# Patient Record
Sex: FEMALE | Race: WHITE
[De-identification: names, ages, dates, MRNs, and addresses within clinical notes are randomized per-mention and may not be internally consistent; named-entity substitution may affect disease eponyms.]

---

## 2019-09-20 ENCOUNTER — HOSPITAL ENCOUNTER (INPATIENT)
Dept: HOSPITAL 26 - MED | Age: 59
LOS: 4 days | Discharge: HOSPICE | DRG: 871 | End: 2019-09-24
Attending: INTERNAL MEDICINE | Admitting: INTERNAL MEDICINE
Payer: COMMERCIAL

## 2019-09-20 VITALS — SYSTOLIC BLOOD PRESSURE: 169 MMHG | DIASTOLIC BLOOD PRESSURE: 103 MMHG

## 2019-09-20 VITALS — SYSTOLIC BLOOD PRESSURE: 133 MMHG | DIASTOLIC BLOOD PRESSURE: 77 MMHG

## 2019-09-20 VITALS — WEIGHT: 146 LBS | HEIGHT: 62 IN | BODY MASS INDEX: 26.87 KG/M2

## 2019-09-20 DIAGNOSIS — E11.9: ICD-10-CM

## 2019-09-20 DIAGNOSIS — J96.01: ICD-10-CM

## 2019-09-20 DIAGNOSIS — D64.9: ICD-10-CM

## 2019-09-20 DIAGNOSIS — C78.01: ICD-10-CM

## 2019-09-20 DIAGNOSIS — Z85.828: ICD-10-CM

## 2019-09-20 DIAGNOSIS — Z85.118: ICD-10-CM

## 2019-09-20 DIAGNOSIS — E83.52: ICD-10-CM

## 2019-09-20 DIAGNOSIS — J44.0: ICD-10-CM

## 2019-09-20 DIAGNOSIS — G47.33: ICD-10-CM

## 2019-09-20 DIAGNOSIS — E87.2: ICD-10-CM

## 2019-09-20 DIAGNOSIS — R65.21: ICD-10-CM

## 2019-09-20 DIAGNOSIS — Z90.710: ICD-10-CM

## 2019-09-20 DIAGNOSIS — Z82.49: ICD-10-CM

## 2019-09-20 DIAGNOSIS — Z80.8: ICD-10-CM

## 2019-09-20 DIAGNOSIS — D47.3: ICD-10-CM

## 2019-09-20 DIAGNOSIS — G93.41: ICD-10-CM

## 2019-09-20 DIAGNOSIS — I11.9: ICD-10-CM

## 2019-09-20 DIAGNOSIS — R74.8: ICD-10-CM

## 2019-09-20 DIAGNOSIS — Z66: ICD-10-CM

## 2019-09-20 DIAGNOSIS — J18.9: ICD-10-CM

## 2019-09-20 DIAGNOSIS — C78.7: ICD-10-CM

## 2019-09-20 DIAGNOSIS — Z92.21: ICD-10-CM

## 2019-09-20 DIAGNOSIS — C79.51: ICD-10-CM

## 2019-09-20 DIAGNOSIS — A41.9: Primary | ICD-10-CM

## 2019-09-20 DIAGNOSIS — C34.92: ICD-10-CM

## 2019-09-20 DIAGNOSIS — E46: ICD-10-CM

## 2019-09-20 DIAGNOSIS — K21.9: ICD-10-CM

## 2019-09-20 LAB
ALBUMIN FLD-MCNC: 2.1 G/DL (ref 3.4–5)
ANION GAP SERPL CALCULATED.3IONS-SCNC: 14.2 MMOL/L (ref 8–16)
AST SERPL-CCNC: 45 U/L (ref 15–37)
BILIRUB SERPL-MCNC: 0.8 MG/DL (ref 0–1)
BUN SERPL-MCNC: 12 MG/DL (ref 7–18)
CHLORIDE SERPL-SCNC: 99 MMOL/L (ref 98–107)
CK MB SERPL-MCNC: 0.3 NG/ML (ref 0–3.6)
CO2 SERPL-SCNC: 25.3 MMOL/L (ref 21–32)
CREAT SERPL-MCNC: 1.1 MG/DL (ref 0.6–1.3)
DEPRECATED D DIMER PPP-ACNC: > 5000 NG/ML (ref 0–400)
ERYTHROCYTE [DISTWIDTH] IN BLOOD BY AUTOMATED COUNT: 22 % (ref 11.6–13.7)
FIBRINOGEN PPP-MCNC: 389 MG/DL (ref 200–400)
GFR SERPL CREATININE-BSD FRML MDRD: 65 ML/MIN (ref 90–?)
GLUCOSE SERPL-MCNC: 145 MG/DL (ref 74–106)
HCT VFR BLD AUTO: 30.6 % (ref 36–48)
HGB BLD-MCNC: 9.5 G/DL (ref 12–16)
LIPASE SERPL-CCNC: 47 U/L (ref 73–393)
LYMPHOCYTES NFR BLD MANUAL: 15 % (ref 20–46)
MCH RBC QN AUTO: 26 PG (ref 27–31)
MCHC RBC AUTO-ENTMCNC: 31 G/DL (ref 33–37)
MCV RBC AUTO: 81.6 FL (ref 80–94)
MONOCYTES NFR BLD MANUAL: 2 % (ref 5–12)
PLATELET # BLD AUTO: 1039 K/UL (ref 140–450)
POTASSIUM SERPL-SCNC: 3.5 MMOL/L (ref 3.5–5.1)
PROTHROMBIN TIME: 12.5 SECS (ref 10.8–13.4)
RBC # BLD AUTO: 3.74 MIL/UL (ref 4.2–5.4)
SODIUM SERPL-SCNC: 135 MMOL/L (ref 136–145)
WBC # BLD AUTO: 34.7 K/UL (ref 4.8–10.8)

## 2019-09-20 PROCEDURE — P9016 RBC LEUKOCYTES REDUCED: HCPCS

## 2019-09-20 PROCEDURE — C1758 CATHETER, URETERAL: HCPCS

## 2019-09-20 PROCEDURE — C1751 CATH, INF, PER/CENT/MIDLINE: HCPCS

## 2019-09-20 RX ADMIN — SODIUM CHLORIDE SCH MLS/HR: 0.9 INJECTION, SOLUTION INTRAVENOUS at 22:44

## 2019-09-20 RX ADMIN — DEXTROSE SCH MLS/HR: 50 INJECTION, SOLUTION INTRAVENOUS at 22:44

## 2019-09-20 NOTE — NUR
Patient will be admitted to care of DR TAVON LYLE. Admited to ICU.  Will go to 
room7. Belongings list completed.  Report to CAROLINE KLEIN.

## 2019-09-20 NOTE — NUR
PT BIB  C/O SOB. PT TACHYPNEA, LABORED BREATHING W/ ACCESSORY MUSCLE 
USE. PT CONFUSED AT THIS TIME, AAOX2. PT PLACED ON 15L NON-REBREATHER MASK AT 
THIS TIME. ER MD AT BEDSIDE.



MEDHX:LUNG CANCER.

## 2019-09-20 NOTE — NUR
RECEIVED BEDSIDE REPORT FROM ER NURSE NICKY, PATIENT IN BED, AAOX 1 TO PERSON, ON 2 L NC, 
V/S STABLE, PATIENT C/O PAIN, UNABLE TO DESCRIBE HOW BAD PAIN IS BUT STATES PAIN IN IN 
ABDOMINAL REGION. PATIENT UNABLE TO FOLLOW SIMPLE COMMANDS BUT ABLE TO MAKE SIMPLE NEEDS 
KNOWN.  MATTHEW AT BEDSIDE ABLE TO ANSWER ADMISSION QUESTIONS FOR PATIENT SINCE PATIENT 
IS UNABLE TO ANSWER QUESTIONS HERSELF. PUPILS ARE EQUAL ROUND AND REACTIVE SIZE 3. RR EVEN 
AND UNLABORED, LUNG SOUNDS CLEAR IN UPPER LOBES BUT DIMINISHED IN LOWER LOBES, CHEST RISE 
EQUAL AND SYMMETRICAL. S1 AND S2 HEARD, CAP REFILL LESS THAN 2 SECONDS, SKIN IS WARM. 
ABDOMEN ROUND AND FIRM. PATIENT IS INCONTINENT OR URINE. SKIN INTACT. IV IN RIGHT AC 20 G 
SL, DRESSING CLEAN AND INTACT. SAFETY PRECAUTIONS IN PLACE, MRSA SCREEN COLLECTED AND SENT 
TO LAB. WILL CONTINUE TO FREQUENTLY MONITOR.

## 2019-09-21 VITALS — DIASTOLIC BLOOD PRESSURE: 109 MMHG | SYSTOLIC BLOOD PRESSURE: 152 MMHG

## 2019-09-21 VITALS — DIASTOLIC BLOOD PRESSURE: 85 MMHG | SYSTOLIC BLOOD PRESSURE: 140 MMHG

## 2019-09-21 VITALS — SYSTOLIC BLOOD PRESSURE: 132 MMHG | DIASTOLIC BLOOD PRESSURE: 78 MMHG

## 2019-09-21 VITALS — DIASTOLIC BLOOD PRESSURE: 89 MMHG | SYSTOLIC BLOOD PRESSURE: 148 MMHG

## 2019-09-21 VITALS — DIASTOLIC BLOOD PRESSURE: 87 MMHG | SYSTOLIC BLOOD PRESSURE: 136 MMHG

## 2019-09-21 VITALS — DIASTOLIC BLOOD PRESSURE: 70 MMHG | SYSTOLIC BLOOD PRESSURE: 126 MMHG

## 2019-09-21 VITALS — SYSTOLIC BLOOD PRESSURE: 118 MMHG | DIASTOLIC BLOOD PRESSURE: 74 MMHG

## 2019-09-21 VITALS — SYSTOLIC BLOOD PRESSURE: 114 MMHG | DIASTOLIC BLOOD PRESSURE: 70 MMHG

## 2019-09-21 VITALS — DIASTOLIC BLOOD PRESSURE: 76 MMHG | SYSTOLIC BLOOD PRESSURE: 123 MMHG

## 2019-09-21 VITALS — DIASTOLIC BLOOD PRESSURE: 83 MMHG | SYSTOLIC BLOOD PRESSURE: 142 MMHG

## 2019-09-21 VITALS — DIASTOLIC BLOOD PRESSURE: 101 MMHG | SYSTOLIC BLOOD PRESSURE: 182 MMHG

## 2019-09-21 VITALS — SYSTOLIC BLOOD PRESSURE: 158 MMHG | DIASTOLIC BLOOD PRESSURE: 84 MMHG

## 2019-09-21 LAB
ALBUMIN FLD-MCNC: 1.5 G/DL (ref 3.4–5)
ANION GAP SERPL CALCULATED.3IONS-SCNC: 8.1 MMOL/L (ref 8–16)
APPEARANCE UR: CLEAR
AST SERPL-CCNC: 36 U/L (ref 15–37)
BILIRUB SERPL-MCNC: 0.6 MG/DL (ref 0–1)
BILIRUB UR QL STRIP: NEGATIVE
BUN SERPL-MCNC: 11 MG/DL (ref 7–18)
CHLORIDE SERPL-SCNC: 103 MMOL/L (ref 98–107)
CO2 SERPL-SCNC: 29.1 MMOL/L (ref 21–32)
COLOR UR: YELLOW
CREAT SERPL-MCNC: 0.9 MG/DL (ref 0.6–1.3)
ERYTHROCYTE [DISTWIDTH] IN BLOOD BY AUTOMATED COUNT: 21.7 % (ref 11.6–13.7)
GFR SERPL CREATININE-BSD FRML MDRD: 82 ML/MIN (ref 90–?)
GLUCOSE SERPL-MCNC: 90 MG/DL (ref 74–106)
GLUCOSE UR STRIP-MCNC: NEGATIVE MG/DL
HCT VFR BLD AUTO: 23.6 % (ref 36–48)
HGB BLD-MCNC: 7.3 G/DL (ref 12–16)
HGB UR QL STRIP: (no result)
LEUKOCYTE ESTERASE UR QL STRIP: NEGATIVE
LYMPHOCYTES NFR BLD MANUAL: 8 % (ref 20–46)
MCH RBC QN AUTO: 25 PG (ref 27–31)
MCHC RBC AUTO-ENTMCNC: 31 G/DL (ref 33–37)
MCV RBC AUTO: 81.9 FL (ref 80–94)
MONOCYTES NFR BLD MANUAL: 5 % (ref 5–12)
NITRITE UR QL STRIP: POSITIVE
PH UR STRIP: 7 [PH] (ref 5–9)
PLATELET # BLD AUTO: 633 K/UL (ref 140–450)
POTASSIUM SERPL-SCNC: 3.2 MMOL/L (ref 3.5–5.1)
RBC # BLD AUTO: 2.87 MIL/UL (ref 4.2–5.4)
RBC #/AREA URNS HPF: (no result) /HPF (ref 0–5)
SODIUM SERPL-SCNC: 137 MMOL/L (ref 136–145)
WBC # BLD AUTO: 22.7 K/UL (ref 4.8–10.8)
WBC,URINE: (no result) /HPF (ref 0–5)

## 2019-09-21 PROCEDURE — 30233N1 TRANSFUSION OF NONAUTOLOGOUS RED BLOOD CELLS INTO PERIPHERAL VEIN, PERCUTANEOUS APPROACH: ICD-10-PCS | Performed by: INTERNAL MEDICINE

## 2019-09-21 RX ADMIN — DEXTROSE SCH MLS/HR: 50 INJECTION, SOLUTION INTRAVENOUS at 06:01

## 2019-09-21 RX ADMIN — DEXTROSE SCH MLS/HR: 50 INJECTION, SOLUTION INTRAVENOUS at 12:00

## 2019-09-21 RX ADMIN — LEVOFLOXACIN SCH MLS/HR: 5 INJECTION, SOLUTION INTRAVENOUS at 18:22

## 2019-09-21 RX ADMIN — MORPHINE SULFATE PRN MG: 2 INJECTION, SOLUTION INTRAMUSCULAR; INTRAVENOUS at 00:47

## 2019-09-21 RX ADMIN — Medication SCH MG: at 21:00

## 2019-09-21 RX ADMIN — DEXTROSE SCH MLS/HR: 50 INJECTION, SOLUTION INTRAVENOUS at 21:06

## 2019-09-21 RX ADMIN — ALBUTEROL SULFATE PRN MG: 2.5 SOLUTION RESPIRATORY (INHALATION) at 20:08

## 2019-09-21 RX ADMIN — DEXTROSE AND SODIUM CHLORIDE SCH MLS/HR: 5; .9 INJECTION, SOLUTION INTRAVENOUS at 15:27

## 2019-09-21 RX ADMIN — SODIUM CHLORIDE SCH MLS/HR: 0.9 INJECTION, SOLUTION INTRAVENOUS at 11:49

## 2019-09-21 RX ADMIN — ACETAMINOPHEN PRN MG: 325 TABLET ORAL at 17:18

## 2019-09-21 RX ADMIN — DEXTROSE SCH MLS/HR: 5 SOLUTION INTRAVENOUS at 21:14

## 2019-09-21 RX ADMIN — DEXTROSE SCH MLS/HR: 50 INJECTION, SOLUTION INTRAVENOUS at 17:17

## 2019-09-21 RX ADMIN — IPRATROPIUM BROMIDE PRN MG: 0.5 SOLUTION RESPIRATORY (INHALATION) at 20:08

## 2019-09-21 RX ADMIN — DEXTROSE SCH MLS/HR: 5 SOLUTION INTRAVENOUS at 10:49

## 2019-09-21 RX ADMIN — SODIUM CHLORIDE SCH MLS/HR: 0.9 INJECTION, SOLUTION INTRAVENOUS at 03:11

## 2019-09-21 NOTE — NUR
PT IS HAVING FEVER 101.3F, TYLENOL GIVEN AND COOLING MEASURE IN USE, BLOOD BANK CALLED THE 
[facility] IS READY, PAGED DR. LYLE REGARDING THE FEVER, WILL WAITING FOR CALL BACK.

## 2019-09-21 NOTE — NUR
IV ON RIGHT AC INFILTRATED, REMOVED IV, INSERTED NEW IV LINE TO LEFT HAND 22GA WITH GOOD 
BLOOD RETURN, PT TOLERATED WELL.

## 2019-09-21 NOTE — NUR
SPOKE WITH DR. MYERS, INFORMED THAT MIDLINE/PICC LINE STILL HAS NOT BEEN INSERTED, STILL 
PENDING ON THE CTA. NO NEW ORDERS AT THIS TIME

## 2019-09-21 NOTE — NUR
PATIENT IN BED WITH EYES CLOSED, RESTING WELL, RESPONDS TO LIGHT SHAKING AND NAME/VOICE. 
ANOx1, PERRL, BRISK, 3MM. WHEEZING HEARD IN UPPER LOBES, DIMINISHED IN LOWER LOBES. PATIENT 
HAS NASAL CANNULA IN PLACE, 2L, SATURATION 99%. SKIN IS WARM AND DRY, INTACT, AXILLARY TEMP 
99.2.. S1S2, SR ON MONITOR. PERIPHERAL IV  LEFT HAND 22G, FLUSHED, PATENT, INTACT INFUSING 
D5NS @ 75ML. RIGHT WRIST 20G SALINE LOCKED, FLUSHED AND ASYMPTOMATIC. ABDOMEN SOFT AND 
NONTENDER, BOWEL SOUNDS ACTIVE IN ALL QUADRANTS. PATIENT IS INCONTINENT, KAMALJIT PADS IN 
PLACE. BED IS LOCKED AND IN LOW POSITION, SIDE RAILS UP, PATIENT ORIENTED TO CALL LIGHT, 
SAFETY MEASURES IN PLACE. WILL CONTINUE TO MONITOR

## 2019-09-21 NOTE — NUR
RECEIVED BEDSIDE REPORT FROM NIGHT SHIFT NURSE, PT AAOX1, DROWSY, ABLE TO FOLLOW COMMANDS 
SOMETIMES, VSS, DENIES PAIN, NO S/S OF DISTRESS, WHEEZING LUNG SOUNDS DANIEL, ON O2 AT 2L VIA 
NC. SR ON CARDIAC MONITOR, CAP REFILL <2 SEC, SOFT ABDOMEN WITH ACTIVE BOWEL SOUNDS, 
INCONTINENT WITH B&B'S, ABLE TO MOVE ALL EXTREMITIES, GENERALZIED WEAKNESS NOTED, SKIN IS 
WARM AND DRY TO TOUCH, NO OPEN WOUND, IV SITE TO RIGHT AC, 20GA, PATENT AND RUNNING NS AT 50 
ML/HR,  HOB ELEVATED TO 30 DEGREES,  POSITION CHANGED FOR OFF LOAD PRESSURE, SAFETY MEASURE 
IN PLACE, WILL CONTINUE TO MONITOR.

## 2019-09-21 NOTE — NUR
RECEIVED REPORT FROM DAYSHIFT NURSE AT PATIENTS BEDSIDE, FOR CONTINUITY OF CARE, FAMILY 
MEMBERS AT BEDSIDE. NO COMPLAINTS AT THIS TIME, VSS, WILL FOLLOWUP CARE

## 2019-09-21 NOTE — NUR
PM CARE AND ORAL CARE PROVIDED, VSS, DENIES PAIN, TEMP 100.5F, COOLING MEASURE IN USE, 
TYLENOL GIVEN, WILL CONTINUE TO MONITOR, POSITION CHANGED FOR OFF LOAD PRESSURE.

## 2019-09-21 NOTE — NUR
DR. MYERS CAME IN TO SEE PATIENT AT BEDSIDE, ANSWERED QUESTIONS FROM FAMILY MEMBERS, WILL 
FOLLOW UP WITH NEW ORDERS.

## 2019-09-21 NOTE — NUR
DR. MYERS SPOKE TO PATIENT'S  REGARDING CODE STATUS, PATIENT'S  VERBALIZED 
UNDERSTANDING IT AND SIGNED POST AT THIS TIME, ALSO SIGNED CONSENT FOR CT ANGIO CHEST W/WO 
CONTRAST.

## 2019-09-21 NOTE — NUR
COULD NOT GET PERIPHERAL LINE VIA AC AT THIS TIME, CT W/CONTRACT COULD NOT DONE, INFORMED 
DR. MYERS, OK TO INSERT PICC LINE, DR. MYERS EXPLAINED THE NEED OF USING PICC LINE, PT'S 
 AGREED AND SIGNED INFORM CONSENT.

## 2019-09-21 NOTE — NUR
PT'S  CAME BACK WITH MEDICATION LIST AT HOME, INPUT IN SYSTEM, WILL WAITING FOR DR. LYLE TO LOOK AT IT.

## 2019-09-22 VITALS — DIASTOLIC BLOOD PRESSURE: 71 MMHG | SYSTOLIC BLOOD PRESSURE: 107 MMHG

## 2019-09-22 VITALS — SYSTOLIC BLOOD PRESSURE: 138 MMHG | DIASTOLIC BLOOD PRESSURE: 72 MMHG

## 2019-09-22 VITALS — SYSTOLIC BLOOD PRESSURE: 97 MMHG | DIASTOLIC BLOOD PRESSURE: 62 MMHG

## 2019-09-22 VITALS — DIASTOLIC BLOOD PRESSURE: 86 MMHG | SYSTOLIC BLOOD PRESSURE: 140 MMHG

## 2019-09-22 VITALS — SYSTOLIC BLOOD PRESSURE: 130 MMHG | DIASTOLIC BLOOD PRESSURE: 76 MMHG

## 2019-09-22 VITALS — SYSTOLIC BLOOD PRESSURE: 98 MMHG | DIASTOLIC BLOOD PRESSURE: 60 MMHG

## 2019-09-22 VITALS — SYSTOLIC BLOOD PRESSURE: 152 MMHG | DIASTOLIC BLOOD PRESSURE: 89 MMHG

## 2019-09-22 VITALS — SYSTOLIC BLOOD PRESSURE: 103 MMHG | DIASTOLIC BLOOD PRESSURE: 66 MMHG

## 2019-09-22 VITALS — SYSTOLIC BLOOD PRESSURE: 100 MMHG | DIASTOLIC BLOOD PRESSURE: 62 MMHG

## 2019-09-22 VITALS — SYSTOLIC BLOOD PRESSURE: 131 MMHG | DIASTOLIC BLOOD PRESSURE: 77 MMHG

## 2019-09-22 VITALS — SYSTOLIC BLOOD PRESSURE: 147 MMHG | DIASTOLIC BLOOD PRESSURE: 85 MMHG

## 2019-09-22 VITALS — DIASTOLIC BLOOD PRESSURE: 98 MMHG | SYSTOLIC BLOOD PRESSURE: 141 MMHG

## 2019-09-22 LAB
ANION GAP SERPL CALCULATED.3IONS-SCNC: 11.5 MMOL/L (ref 8–16)
BUN SERPL-MCNC: 11 MG/DL (ref 7–18)
CHLORIDE SERPL-SCNC: 102 MMOL/L (ref 98–107)
CO2 SERPL-SCNC: 23.7 MMOL/L (ref 21–32)
CREAT SERPL-MCNC: 0.9 MG/DL (ref 0.6–1.3)
ERYTHROCYTE [DISTWIDTH] IN BLOOD BY AUTOMATED COUNT: 20.7 % (ref 11.6–13.7)
GFR SERPL CREATININE-BSD FRML MDRD: 82 ML/MIN (ref 90–?)
GLUCOSE SERPL-MCNC: 125 MG/DL (ref 74–106)
HCT VFR BLD AUTO: 27 % (ref 36–48)
HGB BLD-MCNC: 8.6 G/DL (ref 12–16)
LYMPHOCYTES NFR BLD MANUAL: 2 % (ref 20–46)
MCH RBC QN AUTO: 27 PG (ref 27–31)
MCHC RBC AUTO-ENTMCNC: 32 G/DL (ref 33–37)
MCV RBC AUTO: 84 FL (ref 80–94)
MONOCYTES NFR BLD MANUAL: 6 % (ref 5–12)
PLATELET # BLD AUTO: 669 K/UL (ref 140–450)
POTASSIUM SERPL-SCNC: 3.2 MMOL/L (ref 3.5–5.1)
RBC # BLD AUTO: 3.21 MIL/UL (ref 4.2–5.4)
SODIUM SERPL-SCNC: 134 MMOL/L (ref 136–145)
WBC # BLD AUTO: 33.4 K/UL (ref 4.8–10.8)

## 2019-09-22 PROCEDURE — 5A09357 ASSISTANCE WITH RESPIRATORY VENTILATION, LESS THAN 24 CONSECUTIVE HOURS, CONTINUOUS POSITIVE AIRWAY PRESSURE: ICD-10-PCS | Performed by: INTERNAL MEDICINE

## 2019-09-22 RX ADMIN — DEXTROSE AND SODIUM CHLORIDE SCH MLS/HR: 5; .9 INJECTION, SOLUTION INTRAVENOUS at 10:09

## 2019-09-22 RX ADMIN — DEXTROSE SCH MLS/HR: 5 SOLUTION INTRAVENOUS at 10:10

## 2019-09-22 RX ADMIN — ALBUTEROL SULFATE PRN MG: 2.5 SOLUTION RESPIRATORY (INHALATION) at 11:14

## 2019-09-22 RX ADMIN — DEXTROSE SCH MLS/HR: 50 INJECTION, SOLUTION INTRAVENOUS at 12:41

## 2019-09-22 RX ADMIN — MORPHINE SULFATE PRN MG: 2 INJECTION, SOLUTION INTRAMUSCULAR; INTRAVENOUS at 19:52

## 2019-09-22 RX ADMIN — ALBUTEROL SULFATE PRN MG: 2.5 SOLUTION RESPIRATORY (INHALATION) at 02:41

## 2019-09-22 RX ADMIN — IPRATROPIUM BROMIDE PRN MG: 0.5 SOLUTION RESPIRATORY (INHALATION) at 02:41

## 2019-09-22 RX ADMIN — DEXTROSE SCH MLS/HR: 50 INJECTION, SOLUTION INTRAVENOUS at 17:27

## 2019-09-22 RX ADMIN — PANTOPRAZOLE SODIUM SCH MG: 40 TABLET, DELAYED RELEASE ORAL at 06:30

## 2019-09-22 RX ADMIN — FERROUS SULFATE TAB 325 MG (65 MG ELEMENTAL FE) SCH MG: 325 (65 FE) TAB at 10:09

## 2019-09-22 RX ADMIN — PYRIDOXINE HCL TAB 50 MG SCH MG: 50 TAB at 10:09

## 2019-09-22 RX ADMIN — MORPHINE SULFATE PRN MG: 2 INJECTION, SOLUTION INTRAMUSCULAR; INTRAVENOUS at 04:18

## 2019-09-22 RX ADMIN — DEXTROSE SCH MLS/HR: 50 INJECTION, SOLUTION INTRAVENOUS at 00:00

## 2019-09-22 RX ADMIN — IPRATROPIUM BROMIDE PRN MG: 0.5 SOLUTION RESPIRATORY (INHALATION) at 11:14

## 2019-09-22 RX ADMIN — IPRATROPIUM BROMIDE PRN MG: 0.5 SOLUTION RESPIRATORY (INHALATION) at 18:22

## 2019-09-22 RX ADMIN — DEXTROSE SCH MLS/HR: 5 SOLUTION INTRAVENOUS at 21:53

## 2019-09-22 RX ADMIN — Medication SCH MG: at 10:09

## 2019-09-22 RX ADMIN — ALBUTEROL SULFATE PRN MG: 2.5 SOLUTION RESPIRATORY (INHALATION) at 18:22

## 2019-09-22 RX ADMIN — Medication SCH MG: at 19:51

## 2019-09-22 RX ADMIN — ENOXAPARIN SODIUM SCH MG: 30 INJECTION SUBCUTANEOUS at 10:08

## 2019-09-22 RX ADMIN — DEXTROSE SCH MLS/HR: 50 INJECTION, SOLUTION INTRAVENOUS at 06:08

## 2019-09-22 RX ADMIN — LEVOFLOXACIN SCH MLS/HR: 5 INJECTION, SOLUTION INTRAVENOUS at 18:15

## 2019-09-22 RX ADMIN — DEXTROSE AND SODIUM CHLORIDE SCH MLS/HR: 5; .9 INJECTION, SOLUTION INTRAVENOUS at 17:43

## 2019-09-22 NOTE — NUR
PATIENT BACK IN ICU BED 7, CTA HEAD AND CHEST COMPLETE, WITH NO INCIDENTS NOTED. WILL 
CONTINUE CLOSE MONITORING

## 2019-09-22 NOTE — NUR
PATIENT TACHYPNEIC WITH INCREASED WORK OF BREATHING. FAMILY AT BEDSIDE. PLACED ON BIPAP AT 
DOCUMENTED SETTINGS.  SKIN PROTECTIVE GEL BARRIER IN PLACE. WILL MONITOR CLOSELY.

## 2019-09-22 NOTE — NUR
PATIENT ANXIOUS. PRESENTS WITH SHORTNESS OF BREATH. PRN BREATHING TREATMENT ADMINISTERED. 
FAMILY AT BEDSIDE. TOLERATED TX WITHOUT ADVERSE SIDE EFFECTS. WILL CONTINUE TO MONITOR.

## 2019-09-22 NOTE — NUR
1 UNIT BLOOD TRANSFUSION COMPLETE, NO INTERRUPTIONS NO REACTIONS, SITE FLUSHED AND SALINE 
LOCKED. VITAL SIGNS STABLE, NO COMPLAINTS DENIES PAIN.

## 2019-09-22 NOTE — NUR
RECEIVED REPORT FROM AM NURSE. PT AT BED AWAKE, PERRLA 3MM, ALERT X1, RESPONDS TO NAME, ON 
02 4L/MIN, NC, RR 46, BREATHING LABOROUSLY, HEART RATE REGULAR, SINUS TACHYCARDIA 115, BP 
162/82, CAP REFILL <3S, S1S2 PRESENT, ABDOMEN, SOFT, ROUND, NONDISTENDED, NONTENDER, BOWEL 
SOUNDS ACTIVE IN ALL QUADRANTS, PT HAS GENERALIZED WEAKNESS, SKIN INTACT, WARM, DRY, COLOR 
APPROPRIATE TO ETHNICITY, PT HAS RIGHT UPPER ARM PICC LINE, RUNNING D5 NS AT 75 ML/HR, LEFT 
HAND GAUGE 22 SL. HOB 30 DEGREES, SIDE RAILS UP X2, BED AT LOWEST POSITION. PT FAMILY AT 
BEDSIDE. 

-------------------------------------------------------------------------------

Addendum: 09/22/19 at 2025 by Valentino Bryan RN

-------------------------------------------------------------------------------

LUNGS SOUNDS RONCHI ON BILATERAL UPPER LOBES, DIMINISHED AT BASES.

## 2019-09-22 NOTE — NUR
PT INCONTINENT OF URINE.WASHED,GOWN CHANGED.  RESP 47; PT APPEARS AGITATED/RESTLESS; 
OFFERED ATIVAN AS ORDERED; PTS DAUGHTER AT BEDSIDE AND REFUSED MEDICATION.OFFERED MORPHINE; 
PTS DAUGHTER ALSO REFUSED.

## 2019-09-22 NOTE — NUR
RECEIVED BEDSIDE REPORT FROM NIGHT SHIFT NURSE, PT IS DROWSY, ABLE TO FOLLOW COMMANDS 
SOMETIMES, VSS, FLACC 0, NO S/S OF DISTRESS, RHONCHI LUNG SOUNDS RLL, DIMINISHED LUNG SOUNDS 
TO LLL, ON O2 AT 3L VIA NC. SR ON CARDIAC MONITOR, CAP REFILL <2 SEC, SOFT ABDOMEN WITH 
ACTIVE BOWEL SOUNDS, INCONTINENT WITH B&B'S, ABLE TO MOVE ALL EXTREMITIES, GENERALIZED 
WEAKNESS NOTED, SKIN IS WARM AND DRY TO TOUCH, NO OPEN WOUND, PICC LINE TO RIGHT UPPER ARM, 
PATENT, D5 NS AT 75 ML/HR,  HOB ELEVATED TO 30 DEGREES,  POSITION CHANGED FOR OFF LOAD 
PRESSURE, SAFETY MEASURE IN PLACE, WILL CONTINUE TO MONITOR.

## 2019-09-22 NOTE — NUR
PATIENT HAS BEEN SCREENED AND CATEGORIZED AS HIGH NUTRITION RISK. PATIENT WILL BE SEEN 
WITHIN 1-2 DAYS OF ADMISSION.



09/21/19-09/22/19



GORGE IQBAL MS, RDN

## 2019-09-22 NOTE — NUR
DR LYLE CALLED, MADE AWARE OF PICC LINE XR RESULT THAT PICC IS IN PLACE.  OK TO USE PER DR LYLE.

DR LYLE ASKED ABUT PATIENT'S CONDITION, LET DR LYLE KNOW THAT PATIENT IS AGITATED, , BP 
172/105; FAMILY MEMBER  AT THE BEDSIDE REFUSED ATIVAN AND MORPHINE.  DR LYLE STATED THAT 
PATIENT MIGHT HAVE FULL BLADDER AND ORDERED BLADDER SCAN AND STRAIGHT CATH IF NEEDED.  TOLD 
DR LYLE THAT RNs JUST CLEANED PATIENT, PATIENT WAS WET;  DR LYLE STILL WANT TO BLADDER SCAN.   
ALSO ORDERED CT OF THE HEAD WITHOUT CONTRAST FOR TOMORROW; TOLD DR LYLE THAT CT OF THE HEAD 
WITHOUT CONTRAST ALREADY ORDERED; DR LYLE STATED OK NOT TO ORDER IT AGAIN

## 2019-09-22 NOTE — NUR
PT IS ASLEEP IN BED, VSS, FLACC 0,  AT BEDSIDE, NO S/S OF DISTRESS, POSITION CHANGED 
FOR OFF LOAD PRESSURE.

## 2019-09-22 NOTE — NUR
PICC LINE NURSE AT BEDSIDE TO INSERT PICC TO RIGHT UPPER ARM. TIME OUT PERFORMED. PATIENT 
TOLERATED FAIRLY

## 2019-09-22 NOTE — NUR
BLADDER SCAN COMPLETE, 183ML IS HIGHEST NOTED. WILL ENDORSE TO MD. PATIENT IS INCONTINENT, 
URINE NOTED IN KAMALJIT PAD.

## 2019-09-22 NOTE — NUR
DR. LYLE CAME IN TO SEE PATIENT AT BEDSIDE, UPDATED PATIENT'S CONDITION TO PATIENT'S , 
WILL FOLLOW UP WITH NEW ORDERS.

## 2019-09-22 NOTE — NUR
PM CARE AND ORAL CARE PROVIDED, NO S/S OF DISTRESS, VSS, DENIES PAIN, POSITION CHANGED FOR 
OFF LOAD PRESSURE.

## 2019-09-22 NOTE — NUR
XRAY AT BEDSIDE TO CONFIRM PICC LINE PLACEMENT, PER PICC LINE NURSE, PICC LINE OK TO USE. 

-------------------------------------------------------------------------------

Addendum: 09/22/19 at 0220 by Carol Barreto RN

-------------------------------------------------------------------------------

PENDING OFFICIAL RESULTS AND ORDER.

## 2019-09-22 NOTE — NUR
PATIENT TRANSFERRED TO RADIOLOGY FOR CT HEAD AND CHEST. PER PT AND FAMILY MEMBER, OK TO GIVE 
PRN MORPHINE.

## 2019-09-22 NOTE — NUR
DR MITTAL AT BEDSIDE. UPDATED MD WITH PT CONDITION. NO NEW ORDERS AT THIS TIME. PT AT STABLE 
CONDITION AT THIS TIME. VS WNL.

## 2019-09-22 NOTE — NUR
PATIENT AWAKE AND ALERT. FAMILY AT BEDSIDE. PATIENT BREATHING COMFORTABLY. REQUESTS TO TAKE 
BIPAP OFF AT THIS TIME. PLACED PATIENT ON 4L NASAL CANNULA, PULSE OX SAT 98%, RR 22. BIPAP 
ON STANDBY. NO ACUTE DISTRESS NOTED AT THIS TIME. WILL CONTINUE TO MONITOR.

## 2019-09-22 NOTE — NUR
PATIENT IS ASLEEP AND DROWSY, NOT ABLE TO TAKE ORAL MEDICATION AT THIS TIME, PATIENT'S 
 REFUSED PROTONIX FOR PATIENT, RISK AND BENEFIT EXPLAINED.

## 2019-09-22 NOTE — NUR
PT IS AWAKE, ABLE TO TAKE MORNING PO MEDICATION CRASHED WITH APPLE SAUCE, COUGHING NOTED 
WHEN SWALLOW WATER, DR. LYLE NOTIFIED WILL DO ST EVAL AS ORDERED.

## 2019-09-23 VITALS — SYSTOLIC BLOOD PRESSURE: 90 MMHG | DIASTOLIC BLOOD PRESSURE: 55 MMHG

## 2019-09-23 VITALS — SYSTOLIC BLOOD PRESSURE: 145 MMHG | DIASTOLIC BLOOD PRESSURE: 87 MMHG

## 2019-09-23 VITALS — SYSTOLIC BLOOD PRESSURE: 132 MMHG | DIASTOLIC BLOOD PRESSURE: 85 MMHG

## 2019-09-23 VITALS — DIASTOLIC BLOOD PRESSURE: 65 MMHG | SYSTOLIC BLOOD PRESSURE: 108 MMHG

## 2019-09-23 VITALS — DIASTOLIC BLOOD PRESSURE: 79 MMHG | SYSTOLIC BLOOD PRESSURE: 130 MMHG

## 2019-09-23 VITALS — SYSTOLIC BLOOD PRESSURE: 114 MMHG | DIASTOLIC BLOOD PRESSURE: 72 MMHG

## 2019-09-23 VITALS — DIASTOLIC BLOOD PRESSURE: 75 MMHG | SYSTOLIC BLOOD PRESSURE: 132 MMHG

## 2019-09-23 VITALS — SYSTOLIC BLOOD PRESSURE: 103 MMHG | DIASTOLIC BLOOD PRESSURE: 66 MMHG

## 2019-09-23 VITALS — DIASTOLIC BLOOD PRESSURE: 81 MMHG | SYSTOLIC BLOOD PRESSURE: 115 MMHG

## 2019-09-23 VITALS — SYSTOLIC BLOOD PRESSURE: 122 MMHG | DIASTOLIC BLOOD PRESSURE: 66 MMHG

## 2019-09-23 VITALS — DIASTOLIC BLOOD PRESSURE: 73 MMHG | SYSTOLIC BLOOD PRESSURE: 168 MMHG

## 2019-09-23 VITALS — DIASTOLIC BLOOD PRESSURE: 80 MMHG | SYSTOLIC BLOOD PRESSURE: 105 MMHG

## 2019-09-23 VITALS — SYSTOLIC BLOOD PRESSURE: 115 MMHG | DIASTOLIC BLOOD PRESSURE: 75 MMHG

## 2019-09-23 VITALS — SYSTOLIC BLOOD PRESSURE: 106 MMHG | DIASTOLIC BLOOD PRESSURE: 59 MMHG

## 2019-09-23 VITALS — SYSTOLIC BLOOD PRESSURE: 111 MMHG | DIASTOLIC BLOOD PRESSURE: 73 MMHG

## 2019-09-23 LAB
ALBUMIN FLD-MCNC: 1.4 G/DL (ref 3.4–5)
ANION GAP SERPL CALCULATED.3IONS-SCNC: 12.3 MMOL/L (ref 8–16)
AST SERPL-CCNC: 38 U/L (ref 15–37)
BILIRUB SERPL-MCNC: 0.6 MG/DL (ref 0–1)
BUN SERPL-MCNC: 11 MG/DL (ref 7–18)
CHLORIDE SERPL-SCNC: 103 MMOL/L (ref 98–107)
CO2 SERPL-SCNC: 24 MMOL/L (ref 21–32)
CREAT SERPL-MCNC: 0.9 MG/DL (ref 0.6–1.3)
ERYTHROCYTE [DISTWIDTH] IN BLOOD BY AUTOMATED COUNT: 21.5 % (ref 11.6–13.7)
GFR SERPL CREATININE-BSD FRML MDRD: 82 ML/MIN (ref 90–?)
GLUCOSE SERPL-MCNC: 115 MG/DL (ref 74–106)
HCT VFR BLD AUTO: 25.5 % (ref 36–48)
HGB BLD-MCNC: 7.9 G/DL (ref 12–16)
LYMPHOCYTES NFR BLD MANUAL: 7 % (ref 20–46)
MAGNESIUM SERPL-MCNC: 1.8 MG/DL (ref 1.8–2.4)
MCH RBC QN AUTO: 26 PG (ref 27–31)
MCHC RBC AUTO-ENTMCNC: 31 G/DL (ref 33–37)
MCV RBC AUTO: 84.1 FL (ref 80–94)
MONOCYTES NFR BLD MANUAL: 8 % (ref 5–12)
PLATELET # BLD AUTO: 584 K/UL (ref 140–450)
POTASSIUM SERPL-SCNC: 3.3 MMOL/L (ref 3.5–5.1)
RBC # BLD AUTO: 3.03 MIL/UL (ref 4.2–5.4)
SODIUM SERPL-SCNC: 136 MMOL/L (ref 136–145)
WBC # BLD AUTO: 26.7 K/UL (ref 4.8–10.8)

## 2019-09-23 RX ADMIN — IPRATROPIUM BROMIDE PRN MG: 0.5 SOLUTION RESPIRATORY (INHALATION) at 05:15

## 2019-09-23 RX ADMIN — LEVOFLOXACIN SCH MLS/HR: 5 INJECTION, SOLUTION INTRAVENOUS at 18:05

## 2019-09-23 RX ADMIN — DEXTROSE, SODIUM CHLORIDE, AND POTASSIUM CHLORIDE SCH MLS/HR: 5; .9; .15 INJECTION INTRAVENOUS at 14:55

## 2019-09-23 RX ADMIN — PYRIDOXINE HCL TAB 50 MG SCH MG: 50 TAB at 09:26

## 2019-09-23 RX ADMIN — MORPHINE SULFATE PRN MG: 2 INJECTION, SOLUTION INTRAMUSCULAR; INTRAVENOUS at 22:21

## 2019-09-23 RX ADMIN — DEXTROSE SCH MLS/HR: 5 SOLUTION INTRAVENOUS at 22:21

## 2019-09-23 RX ADMIN — ACETAMINOPHEN PRN MG: 325 TABLET ORAL at 17:29

## 2019-09-23 RX ADMIN — PANTOPRAZOLE SODIUM SCH MG: 40 TABLET, DELAYED RELEASE ORAL at 06:30

## 2019-09-23 RX ADMIN — DEXTROSE SCH MLS/HR: 50 INJECTION, SOLUTION INTRAVENOUS at 11:36

## 2019-09-23 RX ADMIN — MORPHINE SULFATE PRN MG: 2 INJECTION, SOLUTION INTRAMUSCULAR; INTRAVENOUS at 06:34

## 2019-09-23 RX ADMIN — ALBUTEROL SULFATE PRN MG: 2.5 SOLUTION RESPIRATORY (INHALATION) at 07:19

## 2019-09-23 RX ADMIN — Medication SCH MG: at 20:57

## 2019-09-23 RX ADMIN — ENOXAPARIN SODIUM SCH MG: 30 INJECTION SUBCUTANEOUS at 09:28

## 2019-09-23 RX ADMIN — WATER SCH MG: 1 INJECTION INTRAMUSCULAR; INTRAVENOUS; SUBCUTANEOUS at 20:56

## 2019-09-23 RX ADMIN — DEXTROSE SCH MLS/HR: 50 INJECTION, SOLUTION INTRAVENOUS at 17:29

## 2019-09-23 RX ADMIN — DEXTROSE SCH MLS/HR: 50 INJECTION, SOLUTION INTRAVENOUS at 00:04

## 2019-09-23 RX ADMIN — Medication SCH MG: at 09:26

## 2019-09-23 RX ADMIN — FERROUS SULFATE TAB 325 MG (65 MG ELEMENTAL FE) SCH MG: 325 (65 FE) TAB at 09:27

## 2019-09-23 RX ADMIN — ALBUTEROL SULFATE PRN MG: 2.5 SOLUTION RESPIRATORY (INHALATION) at 13:15

## 2019-09-23 RX ADMIN — DEXTROSE AND SODIUM CHLORIDE SCH MLS/HR: 5; .9 INJECTION, SOLUTION INTRAVENOUS at 07:06

## 2019-09-23 RX ADMIN — DEXTROSE SCH MLS/HR: 50 INJECTION, SOLUTION INTRAVENOUS at 06:13

## 2019-09-23 RX ADMIN — DEXTROSE SCH MLS/HR: 5 SOLUTION INTRAVENOUS at 09:28

## 2019-09-23 NOTE — NUR
MEDICATIONS ADMINISTERED AS ORDERED. PT TOLERATED WELL. PT VOIDED MODERATE AMOUNT OF YELLOW 
URINE. CLEANED AND REPOSITIONED FOR COMFORT AND OFF LOADING PRESSURE AREAS. CLEAN AND DRY 
LINENS CHANGED. PT C/O SOB, RT ANAND MADE AWARE AND RT AT BEDSIDE. SPO2 96%, RR 28, HR 81, 
/81 AT THIS TIME.  AT BEDSIDE. WILL CONTINUE TO MONITOR.

## 2019-09-23 NOTE — NUR
RECEIVED REPORT FROM DAYSHIFT NURSE AT PATIENTS BEDSIDE. PATIENT AWAKE VISITING WITH FAMILY, 
NO COMPLAINTS AT THIS TIME, WILL FOLLOWUP CARE.

## 2019-09-23 NOTE — NUR
PT IS TACHYPNEIC, RR 40, BREATHING EVEN BUT LABORED, USING ACCESSORY MUSCLES. DR. SARMIENTO MADE 
AWARE. RT ANAND ALSO AWARE, PUTTING BIPAP ON PT AT BEDSIDE. WILL CONTINUE TO MONITOR.

## 2019-09-23 NOTE — NUR
LEFT HAND 22G PERIPHERAL IV WAS FLUSHED, BLOOD LEAKING OUT OF SITE, PATIENT COMPLAINING OF 
PAIN. REMOVED CATHETER, NO KINKS NOTED. PATIENT TOLERATED WELL. PATIENT DENIES PAIN.

## 2019-09-23 NOTE — NUR
PATIENT EXPERIENCING MODERATE DISTRESS, BREATHING IS LABORED, PATIENT COMPLAINING OF PAIN 
AND SHORTNESS OF BREATH, RESPIRATIONS IN 40'S, REQUESTING MORPHINE. TURNED AND REPOSITIONED 
PATIENT IN SEVERAL ATTEMPTS FOR COMFORT. RN AND DAUGHTER AT BEDSIDE. PATIENT STATING SHE 
SEE'S SILHOUETTES BUT KNOWS THAT THEY ARE NOT REALLY THERE. O2 SATS IN 90'S. WILL CONTINUE 
TO STAY WITH PATIENT

## 2019-09-23 NOTE — NUR
CAME TO CHECK ON PATIENT FOR BIPAP CHECK BUT FOUND PT OFF BIPAP WITHOUT RT KNOWLEDGE. 
DAUGHTER AT BEDSIDE. DAUGHTER KEEPS TAKING BIPAP MASK ON AND OFF PATIENT WITHOUT MY 
KNOWLEDGE. EXPLAINED TO DAUGHTER THAT SHE IS NOT SUPPOSED TO TOUCH BIPAP OR MASK AND CALL 
FOR RT. DAUGHTER UNDERSTANDS BUT KEEPS TAKING MASK ON AND OFF AND PUTTING NASAL CANNULA ON 
AND OFF. PATIENT ALSO REFUSING TO WEAR BIPAP EVEN WHEN SHE IS LABORED BREATHING. PATIENT IS 
AWAKE AND ALERT AND FOLLOWS COMMANDS. DAUGHTER IS FORCING PATIENT TO WEAR BIPAP. CHARGE 
NURSE CARLOS A AND RN OLI AWARE OF SITUATION. PT IS IN MILD DISTRESS. CURRENTLY ON 3L NC WITH 
SPO2 OF 97% RR 34 HR 74. WILL CONTINUE TO MONITOR PT.

## 2019-09-23 NOTE — NUR
RECEIVED BEDSIDE REPORT FROM NIGHT SHIFT NURSE. PT IS AWAKE, ABLE TO FOLLOW COMMANDS AND 
MAKE NEEDS KNOWN, DENIES PAIN. AFEBRILE. NORMAL SINUS RHYTHM ON MONITOR. S1 +S2 HEARD. ON O2 
AT 4 LPM/NC. NO SIGNS OF RESPIRATORY DISTRESS NOTED. LUNGS SOUND COARSE BILATERALLY, LOWER 
LOBES DIMINISHED. ABDOMEN SOFT, NONTENDER WITH HYPOACTIVE BOWEL SOUNDS. PICC LINE TO SYLVIA 
PATENT AND INTACT W/ GOOD BLOOD RETURN, RUNNING D5NS AT 75 ML/HR. PT IS INCONTINENT B&B. 
SKIN IS INTACT, DRY AND WARM TO TOUCH. ABLE TO MOVE ALL EXTREMITIES, CAP REFILL < 2 SEC, 
PULSES PALPABLE TO ALL EXTREMITIES. POSITION CHANGED FOR COMFORT AND PRESSURE OFFLOAD. HOB 
AT 30 DEGREES, BED IN LOWEST POSITION LOCKED. CALL LIGHT WITHIN REACH. FAMILY AT BEDSIDE. 
SAFETY PRECAUTIONS IN PLACE. WILL CONTINUE TO MONITOR.

## 2019-09-23 NOTE — NUR
PT AT BED, EYES CLOSED, BREATHING REGULARLY ON NC 02 AT 4L/MIN, PT IS AT STABLE CONDITION AT 
THIS TIME. WILL CONTINUE TO MONITOR.

## 2019-09-23 NOTE — NUR
MEDICATIONS GIVEN. PT AT BED, EYES CLOSED, BREATHING REGULARLY, 02 AT 4L/MIN NC. HOB 30 
DEGREES, SIDE RAILS UP X2, BED AT LOWEST POSITION. WILL CONTINUE TO MONITOR.

## 2019-09-23 NOTE — NUR
AM CARE PROVIDED. PT TOLERATED FAIRLY. PT COMPLAINS OF SOB. HOB 30 DEGREES, SIDE RAILS UP 
X2, BED AT LOWEST POSITION. RT CALLED.

## 2019-09-23 NOTE — NUR
PT PLACED ON BIPAP AND THEN PT REMOVED BIPAP 10 MIN LATER STATING SHE DID NOT WANT IT ON 
ANYMORE. NURSE MADE AWARE. PT ON 4L NC SPO2 97%. WILL CONTINUE TO MONITOR.

## 2019-09-23 NOTE — NUR
RCV'D PT ON 4 L NC. DECREASED PT FLOW TO 3 L NC. SPO2 99%. WILL DECREASE AS TOELRATED. PT 
STARTED TO HAVE MILD DISTRESS. BIPAP ON. MASK SMALL WITH PROTECTIVE GEL UNDERNEATH. BIPAP 
SETTINGS ADJUST TO MAKE PATIENT COMFORTABLE. SETTINGS AS CHARTED. BIPAP CONNECTED TO RED 
OUTLET. AMBU BAG AT BEDSIDE. DAUGHTERS AT BEDSIDE AS WELL AS RN. WILL CONTINUE TO MONITOR.

## 2019-09-23 NOTE — NUR
REPORT GIVEN TO NIGHT SHIFT RN FOR CONTINUITY OF CARE. NO SIGNS OF ACUTE DISTRESS NOTED AT 
THIS TIME.

## 2019-09-23 NOTE — NUR
PATIENT REFUSED SCHEDULED SOLUMEDROL, PATIENT AND FAMILY BELIEVES THE FIRST DOSE INCREASED 
HER DIFFICULTY OF BREATHING. REQUESTING TO SPEAK WITH THE DOCTOR REGARDING MEDICATION, WILL 
MAKE DOCTOR AWARE. 

-------------------------------------------------------------------------------

Addendum: 09/24/19 at 0542 by Carol Barreto RN

-------------------------------------------------------------------------------

CORRECTION: ENTRY INPUT WRONG DATE. PATIENT REFUSED SOLUMEDROL 9/24/19 NOT 9/23/19

## 2019-09-23 NOTE — NUR
MEDICATIONS GIVEN. PT SAT ON RECLINING CHAIR, TOLERATED SITTING FOR 15 MINUTES, REPLACED 
BACK TO BED. PT AT BIPAP AT THIS TIME, MORPHINE 2 GIVEN, PT BREATHING REGULARLY 02 %, 
RR 28 VS WNL. WILL CONTINUE TO MONITOR.

## 2019-09-23 NOTE — NUR
PT HAD MODERATE AMOUNT OF SOFT BROWN BOWEL MOVEMENT. TURNED AND REPOSITIONED. VSS. NO SOB 
NOTED. WILL CONTINUE TO MONITOR.

## 2019-09-23 NOTE — NUR
PROTONIX PO OFFERED SECOND TIME. PT ACCEPTED AND TOOK THE MEDICATION. PT SWALLOWED PILL WITH 
JELLO EASILY.

## 2019-09-23 NOTE — NUR
PT HAVING DINNER. FAMILY AT BEDSIDE. VSS. NO C/O PAIN OR DISCOMFORT. ALL SAFETY PRECAUTIONS 
IN PLACE. WILL CONTINUE TO MONITOR.

## 2019-09-23 NOTE — NUR
PT VOIDED MODERATE AMOUNT OF LIGHT YELLOW URINE. CLEANED AND REPOSITIONED. PT IS TACHYPNEIC 
WITH INCREASED WORK OF BREATHING. ON O2 4 LPM/NC, SPO2 94%. WILL MONITOR CLOSELY.

## 2019-09-23 NOTE — NUR
*S.T. Bedside swallow eval completed*

See report for details. Pt presents w/ adequate oropharyngeal swallow function with no overt 
s/s 

aspiration. However pt does demonstrate general weakness and lethargy and 

limited endurance is likely.

Recommend:

1) Change diet texture to mechanical soft ground diet, thin liquids okay. 

Straws okay. Per spouse, pt prefers fruit and beans. 

2) P.O. meds okay whole, one at a time.

3) 1:1 feeder w/ aspiration precautions.

Pt appears to be functioning at her reported baseline. No further swallow 

tx indicated at this time. D/w pt and spouse results/recommendations and 

endorsed to ERNIE Wilder. Pt c/o 9/10 hip pain, also endorsed to RN.



Time [id number]

## 2019-09-23 NOTE — NUR
PATIENT IN BED, EYES OPEN, MAKES NEEDS KNOWN, FOLLOWS SIMPLE COMMANDS. ANOx2. NASAL CANNULA 
IN PLACE 4L/MIN. RESPIRATIONS ARE EVEN BUT LABORED, TACHYPNEIC-RR32. ALL OTHER VITALS WITHIN 
NORMAL LIMITS. AFEBRILE, SKIN WARM AND DRY. PATIENT HAS A RIGHT UPPER ARM PICC LINE IN 
PLACE, INFUSING D5NS WITH KCL @ 75ML/HR. LEFT HAND 22G SALINE LOCKED. LUNG SOUNDS ARE 
WHEEZES IN UPPER LOBES, LOWER LOBES DIMINISHED. S1S2, SR ON MONITOR. ABDOMEN IS SOFT, ROUND 
AND NONTENDER, BOWEL SOUNDS HYPOACTIVE. PATIENT COMPLAINING OF EXCESS GAS AND BLOATING, BUT 
REPORTS FINALLY HAVING A BOWEL MOVEMENT TODAY. PATIENT IS INCONTINENT, KAMALJIT PADS IN PLACE, 
PERINEAL AREA IS CLEAN AND DRY. PILLOWS IN PLACE TO OFFLOAD PRESSURE AREAS. PATIENT IS 
UPDATED ON CARE PLAN AND ORIENTED TO CALL LIGHT, CALL LIGHT WITHIN REACH. SIDERAILS UP 2X, 
BED LOCKED AND IN LOW POSITION. WILL CONTINUE TO MONITOR

## 2019-09-24 VITALS — SYSTOLIC BLOOD PRESSURE: 96 MMHG | DIASTOLIC BLOOD PRESSURE: 67 MMHG

## 2019-09-24 VITALS — DIASTOLIC BLOOD PRESSURE: 79 MMHG | SYSTOLIC BLOOD PRESSURE: 149 MMHG

## 2019-09-24 VITALS — DIASTOLIC BLOOD PRESSURE: 73 MMHG | SYSTOLIC BLOOD PRESSURE: 116 MMHG

## 2019-09-24 VITALS — SYSTOLIC BLOOD PRESSURE: 117 MMHG | DIASTOLIC BLOOD PRESSURE: 62 MMHG

## 2019-09-24 VITALS — DIASTOLIC BLOOD PRESSURE: 82 MMHG | SYSTOLIC BLOOD PRESSURE: 129 MMHG

## 2019-09-24 VITALS — SYSTOLIC BLOOD PRESSURE: 98 MMHG | DIASTOLIC BLOOD PRESSURE: 65 MMHG

## 2019-09-24 VITALS — DIASTOLIC BLOOD PRESSURE: 56 MMHG | SYSTOLIC BLOOD PRESSURE: 112 MMHG

## 2019-09-24 VITALS — SYSTOLIC BLOOD PRESSURE: 108 MMHG | DIASTOLIC BLOOD PRESSURE: 70 MMHG

## 2019-09-24 VITALS — DIASTOLIC BLOOD PRESSURE: 43 MMHG | SYSTOLIC BLOOD PRESSURE: 107 MMHG

## 2019-09-24 LAB
ANION GAP SERPL CALCULATED.3IONS-SCNC: 14.6 MMOL/L (ref 8–16)
BUN SERPL-MCNC: 11 MG/DL (ref 7–18)
CHLORIDE SERPL-SCNC: 104 MMOL/L (ref 98–107)
CO2 SERPL-SCNC: 22.5 MMOL/L (ref 21–32)
CREAT SERPL-MCNC: 0.9 MG/DL (ref 0.6–1.3)
ERYTHROCYTE [DISTWIDTH] IN BLOOD BY AUTOMATED COUNT: 21.6 % (ref 11.6–13.7)
GFR SERPL CREATININE-BSD FRML MDRD: 82 ML/MIN (ref 90–?)
GLUCOSE SERPL-MCNC: 133 MG/DL (ref 74–106)
HCT VFR BLD AUTO: 29.8 % (ref 36–48)
HGB BLD-MCNC: 9.3 G/DL (ref 12–16)
LYMPHOCYTES NFR BLD MANUAL: 2 % (ref 20–46)
MCH RBC QN AUTO: 27 PG (ref 27–31)
MCHC RBC AUTO-ENTMCNC: 31 G/DL (ref 33–37)
MCV RBC AUTO: 84.7 FL (ref 80–94)
MONOCYTES NFR BLD MANUAL: 2 % (ref 5–12)
PLATELET # BLD AUTO: 713 K/UL (ref 140–450)
POTASSIUM SERPL-SCNC: 4.1 MMOL/L (ref 3.5–5.1)
RBC # BLD AUTO: 3.52 MIL/UL (ref 4.2–5.4)
SODIUM SERPL-SCNC: 137 MMOL/L (ref 136–145)
WBC # BLD AUTO: 39.2 K/UL (ref 4.8–10.8)

## 2019-09-24 RX ADMIN — WATER SCH MG: 1 INJECTION INTRAMUSCULAR; INTRAVENOUS; SUBCUTANEOUS at 05:00

## 2019-09-24 RX ADMIN — WATER SCH MG: 1 INJECTION INTRAMUSCULAR; INTRAVENOUS; SUBCUTANEOUS at 13:00

## 2019-09-24 RX ADMIN — DEXTROSE SCH MLS/HR: 50 INJECTION, SOLUTION INTRAVENOUS at 00:37

## 2019-09-24 RX ADMIN — PYRIDOXINE HCL TAB 50 MG SCH MG: 50 TAB at 09:00

## 2019-09-24 RX ADMIN — DEXTROSE SCH MLS/HR: 50 INJECTION, SOLUTION INTRAVENOUS at 13:03

## 2019-09-24 RX ADMIN — Medication SCH MG: at 09:00

## 2019-09-24 RX ADMIN — DEXTROSE SCH MLS/HR: 5 SOLUTION INTRAVENOUS at 10:59

## 2019-09-24 RX ADMIN — DEXTROSE SCH MLS/HR: 50 INJECTION, SOLUTION INTRAVENOUS at 17:41

## 2019-09-24 RX ADMIN — FERROUS SULFATE TAB 325 MG (65 MG ELEMENTAL FE) SCH MG: 325 (65 FE) TAB at 09:00

## 2019-09-24 RX ADMIN — ENOXAPARIN SODIUM SCH MG: 30 INJECTION SUBCUTANEOUS at 11:02

## 2019-09-24 RX ADMIN — DEXTROSE SCH MLS/HR: 50 INJECTION, SOLUTION INTRAVENOUS at 05:37

## 2019-09-24 RX ADMIN — DEXTROSE, SODIUM CHLORIDE, AND POTASSIUM CHLORIDE SCH MLS/HR: 5; .9; .15 INJECTION INTRAVENOUS at 05:10

## 2019-09-24 RX ADMIN — LEVOFLOXACIN SCH MLS/HR: 5 INJECTION, SOLUTION INTRAVENOUS at 18:21

## 2019-09-24 NOTE — NUR
RECEIVED REPORT FROM PM NURSE. PT IS SLEEPING, TWO DAUGHTERS AT BEDSIDE. PT'S FAMILY DOES 
NOT WANT ME TO WAKE UP PT DUE TO PT JUST FALL ASLEEP ABOUT ONE HOUR AGO, UNABLE TO DO 
PHYSICAL ASSESSMENT. BEDSIDE MONITOR SHOWS SR. O2 SATS 100 % ON 4 L/MIN NC. PT HAS IV  20 
MEQ KCL IN D5NS RUNNING AT 75 CC/HR. WILL CONTINUE TO MONITOR PT.

## 2019-09-24 NOTE — NUR
PROVIDED ORAL CARE, PATIENT TOLERATED WELL. PATIENT NOW ASLEEP FINALLY AND RESTING 
COMFORTABLY. PATIENT DID NOT SLEEP THROUGHOUT THE NIGHT, ONLY ON AND OFF FOR ABOUT 1 HOUR. 
WILL HOLD 0630 PO MED TO ALLOW PATIENT TO SLEEP, WILL ENDORSE TO AM NURSE THAT PATIENT DID 
NOT REFUSE MED BUT SLEEPING AT THE TIME OF ADMINISTRATION.

## 2019-09-24 NOTE — NUR
MORPHINE 1 MG GIVEN BEFORE DISCHARGE. PICC LINE FLUSHED WITH SALINE.REPORT GIVEN TO MARTÍNEZ. 
PT LEFT UNIT,  WITH PT. ALL PERSONAL BELONGINGS WITH PT.

## 2019-09-24 NOTE — NUR
PT'S  CAME IN, PER  HE WANTS TO TALK TO  IN PERSON , CALLED 'S 
OFFICE, HE MAY GO TO 'S OFFICE ABOUT NOON. PT'S  MADE AWARE, I did not give pt 
0900 due meds and he does not want me to give meds to wake up pt. charge nurse made aware.

## 2019-09-24 NOTE — NUR
PATIENT REFUSED SCHEDULED SOLUMEDROL, PATIENT AND FAMILY BELIEVES THE FIRST DOSE INCREASED 
HER DIFFICULTY OF BREATHING. REQUESTING TO SPEAK WITH THE DOCTOR REGARDING MEDICATION, WILL 
MAKE DOCTOR AWARE.

## 2019-09-24 NOTE — NUR
PATIENT IN BED, AWAKE, TRYING TO REST. PATIENT IS RESTLESS BUT DOES NOT WANT ANY OTHER PRN 
MEDICATIONS. PATIENT IS SLIGHTLY CONFUSED, TRYING TO SAY SOMETHING ABOUT HER PULSE OXIMETER 
AND HER IV PUMP, SPEECH IS GARBLED AND DELAYED. REORIENTED THE PATIENT. WILL CONTINUE 
FREQUENT ROUNDS

## 2019-09-24 NOTE — NUR
PT IS SLEEPING AT THIS MOMENT. TWO DAUGHTERS AT BEDSIDE. SINCE PT IS SLEEPING, TWO DAUGHTERS 
WILL LET ME KNOW WHEN PT IS AWAKE, THEN i CAN give pt due meds. Dr. LYLE CALLED IN AND MADE 
AWARE OF Ca LEVEL 12.3 AND PT'S FAMILY REFUSED SOUL-MEDROL.

## 2019-09-24 NOTE — NUR
CONTACTED [facility] -[phone] REGARDING HOSPICE EVALUATION, ABLE TO SPEAK TO 
GORDO.  PROVIDED ME WITH FAX NUMBER [phone] TO SEND REFERRAL.  REFERRAL SENT.  WILL 
FOLLOW UP.

## 2019-09-24 NOTE — NUR
CHECKED IN ON PATIENT, AWAKE IN BED, RESTING FAIRLY. PER DAUGHTER, PATIENT FELL ASLEEP FOR 
ABOUT 20-30 MINUTES, BUT AWAKE AGAIN. REQUESTING ANOTHER PILLOW. READJUSTED HEAD AND ARMS. 
VSS. DENIES PAIN

## 2019-09-24 NOTE — NUR
RECEIVED A CALL FROM MANOJ QUISPE NURSE OF [facility], SHE STATED THAT WE ARE NOT CONTRACTED 
WITH THE INSURANCE.  SHE ALSO STATED THAT SHE KNOWS PATIENT IS IN ICU RIGHT NOW AND IS NOT 
STABLE TO BE TRANSFERRED TO A CONTRACTED FACILITY.  I UPDATED HER OF PATIENT'S CONDITION.  
NINA QUISPE DEPT MADE AWARE.

## 2019-09-24 NOTE — NUR
SPONGE BATH, SKIN CARE, AND PERINEAL CARE PROVIDED, PATIENT TOLERATED POORLY. PATIENT 
BECOMES WINDED AND SOB FROM REPOSITIONING. TOOTH BRUSH,TOOTHPASTE AND MOUTHWASH AT BEDSIDE, 
ORIENTED PATIENT TO DO ORAL CARE WHEN SHE CATCHES HER BREATH AND CLEANING HER MOUTH WILL 
MAKE HER FOOD TASTE BETTER. PATIENT VERBALIZES UNDERSTANDING. VOIDED AND SOILED THE WHOLE 
DIAPER. DIAPER REPLACED. SKIN DRY AND INTACT. SIDERAILS UP, BED IN LOW POSITION, CALL LIGHT 
WITHIN REACH. REPLACED PULSE OXIMETER AND SWITCHED TO RIGHT HAND MIDDLE FINGER PER PATIENTS 
REQUEST.

## 2019-09-24 NOTE — NUR
PATIENTS PHYSICAL APPEARANCE IS IMPROVING, WORK OF BREATHING IS IMPROVING, PATIENT MORE 
CALM, STILL TACHYPNEIC, SATURATIONS IN 90'S, NASAL CANNULA 4L. PATIENTS DAUGHTER INSISTING 
BIPAP MASK WOULD HELP WITH LABORED BREATHING, TOLD DAUGHTER RT WILL GIVE MORE INFORMATION ON 
HOW THE MASK WORKS. RT MADE AWARE OF PATIENTS FAMILY MEMBERS CONCERNS. PATIENT STATES PAIN 
IS NOT AS BAD. WILL CONTINUE CLOSE MONITORING

## 2021-10-14 NOTE — NUR
STRAIGHT CATH PERFORMED APPROX 20 CC OF YELLOW CLEAR URINE DRAINED, SENT TO 
LAB. Quality 47: Advance Care Plan: Advance Care Planning discussed and documented; advance care plan or surrogate decision maker documented in the medical record. Quality 226: Preventive Care And Screening: Tobacco Use: Screening And Cessation Intervention: Patient screened for tobacco use and is an ex/non-smoker Detail Level: Generalized Quality 130: Documentation Of Current Medications In The Medical Record: Current Medications Documented

## 2024-10-28 NOTE — NUR
09/22/19 RD INITIAL ASSESSMENT COMPLETED

PLEASE REFER TO NUTRITION ASSESSMENT UNDER CARE ACTIVITY FOR ESTIMATED NUTRITIONAL NEEDS. 



RD RECOMMENDATIONS:

1. CONTINUE ON BLAND/SOFT DIET AS TOLERATED. 

2. RD WILL F/U 2-3 DAYS; HIGH RISK. 



GORGE IQBAL MS, RDN Patient has had fever (up to 102), cough and sore throat for about 1 week. Patient will need note in order to return to school after missing 4 days. No appointments available; was hoping to be seen tomorrow 10/29. Please call.

## 2025-02-26 NOTE — NUR
SPOKE WITH PHARMACIST MISSY ALEXANDRA TROUGH RESULT AT 18.4H. RECEIVED CONFIRMATION, VANCOCIN OK 
TO GIVE. GENERAL:  Awake, alert and in NAD, non-toxic appearing  ENMT: Airway patent  EYES: conjunctiva clear, no nystagmus bilat   CARDIAC: Regular rate, regular rhythm.  Heart sounds S1, S2, no S3, S4. No murmurs, rubs or gallops.  RESPIRATORY: Breath sounds clear and equal in bilateral anterior lung fields, no wheezes/ronchi/crackles/stridor; pt breathing and speaking comfortably with no increased WOB, no accessory mm. use, no intercostal retractions, no nasal flaring  GI: abdomen soft, non-distended, non-tender, no rebound or guarding.  SKIN: warm and dry, no rashes  PSYCH: awake, alert, calm and cooperative  EXTREMITIES: no ble edema  NEURO: gcs 15  NEURO: pupils 3 mm, PERRL, EOMI (CN III, IV, VI), facial sensation intact to light touch in all 3 divisions bilat (CN V), face is symmetric with normal eye closure, eye opening, and smile (CN VII), hearing is normal to rubbing fingers (CN VII), palate elevates symmetrically, phonation is normal (CN IX, X),  shoulder shrug intact bilat (CN XI), tongue is midline with nl movements and no atrophy (CN XII), finger to nose test nl bilat, negative pronator drift bilat,, speech is clear; 5/5 motor strength BUE and BLE: deltoids, biceps, triceps, wrist flexors/extensors, hand , hip flexors, knee flexors/extensors, plantar/dorsiflexors, hallux flexors/extensors; sensation intact to light touch BUE and BLE: C5-T1 and L3-S1